# Patient Record
(demographics unavailable — no encounter records)

---

## 2025-04-21 NOTE — DISCUSSION/SUMMARY
[EKG obtained to assist in diagnosis and management of assessed problem(s)] : EKG obtained to assist in diagnosis and management of assessed problem(s) [FreeTextEntry1] : 45-year-old with above medical history active medical problems as noted below 1.  Cardiac risk counseling. Encounter for cardiac risk evaluation. Elevated blood pressure today in the office.  No formal diagnosis of hypertension though has been told over few years for borderline elevated blood pressure.  Reviewed pathophysiology with her at length. Goal to keep it less than 130/80. Guidelines reviewed. Recommended Echocardiogram for LV ejection fraction wall motion wall thickness evaluation.  If there is evidence of LVH or left atrial enlargement consider antihypertensive therapy.  Along with that recommended to obtain digital machine at home.  Keep records at home.  Technique to obtain blood pressure readings were reviewed. Obtain labs which were done recently.  Specifically in relation to CMP and lipid panel.  For further restratification.  Based on above testing we can discuss further whether she would benefit from Antihypertensive therapy Further restratification with coronary calcium score or other cardiovascular testing. 2.  Lipid panel reviewed.  10-year ASCVD risk is low.  Lifetime risk is elevated.  Based on above test and repeat lipid panel which was done recently we will discuss further regarding long-term management.  Lifestyle modifications reviewed with low-carb low saturated plant-based Mediterranean type of diet. 3.  Overweight state.  Lifestyle modifications reviewed.  Based on lipid panel, elevated blood pressure and borderline overweight/obesity further discussion can be done in relation to use of GLP-1 agonist medications for further risk reduction specifically if there is definite evidence of metabolic syndrome.  Counseling regarding low saturated fat,salt and carbohydrate intake was reviewed. Active lifestyle and regular exercise  along with weight management is advised. I have reviewed above at length. I answered all the questions. Patient verbalized understandings. Thank you very much for allowing me to participate in your patient's care. Please feel free to call me for any questions. Sincerely,  Alexandro Contreras MD, FACC, [facility]

## 2025-04-21 NOTE — REASON FOR VISIT
[CV Risk Factors and Non-Cardiac Disease] : CV risk factors and non-cardiac disease [FreeTextEntry3] : Dr. Stokes [FreeTextEntry1] : 45-year-old female is here for cardiac restratification and cardiac risk counseling.  Main complaint is asthma/fibrocystic breast disease According to her in the past she has noted to have borderline high blood pressure.  Recommended lifestyle modifications.  No formal diagnosis of essential hypertension. She has no history of pregnancies or miscarriages. No prior history of hypertension, diabetes mellitus, myocardial infarction, coronary artery disease, cerebrovascular accident, peripheral artery disease, rheumatic fever, thyroid or Lyme disease She is non-smoker Social alcohol consumption Stressful business Tries to be active.  Does not have any structured aerobic exercise program. Diet is not well-controlled in relation to carbohydrate intake. Unable to lose weight. No prior cardiovascular testing.